# Patient Record
Sex: MALE | Race: WHITE | Employment: FULL TIME | URBAN - METROPOLITAN AREA
[De-identification: names, ages, dates, MRNs, and addresses within clinical notes are randomized per-mention and may not be internally consistent; named-entity substitution may affect disease eponyms.]

---

## 2023-11-29 ENCOUNTER — HOSPITAL ENCOUNTER (EMERGENCY)
Age: 48
Discharge: ANOTHER HOSPITAL | End: 2023-11-30
Attending: EMERGENCY MEDICINE
Payer: COMMERCIAL

## 2023-11-29 DIAGNOSIS — C78.00 MELANOMA METASTATIC TO LUNG, UNSPECIFIED LATERALITY (HCC): ICD-10-CM

## 2023-11-29 DIAGNOSIS — R04.2 MASSIVE HEMOPTYSIS: Primary | ICD-10-CM

## 2023-11-29 LAB
ANION GAP SERPL CALCULATED.3IONS-SCNC: 12 MMOL/L (ref 9–17)
BASOPHILS # BLD: 0 K/UL (ref 0–0.2)
BASOPHILS NFR BLD: 1 % (ref 0–2)
BUN SERPL-MCNC: 13 MG/DL (ref 6–20)
CALCIUM SERPL-MCNC: 9.5 MG/DL (ref 8.6–10.4)
CHLORIDE SERPL-SCNC: 103 MMOL/L (ref 98–107)
CO2 SERPL-SCNC: 22 MMOL/L (ref 20–31)
CREAT SERPL-MCNC: 0.9 MG/DL (ref 0.7–1.2)
EOSINOPHIL # BLD: 0.1 K/UL (ref 0–0.4)
EOSINOPHILS RELATIVE PERCENT: 2 % (ref 1–4)
ERYTHROCYTE [DISTWIDTH] IN BLOOD BY AUTOMATED COUNT: 13.4 % (ref 12.5–15.4)
GFR SERPL CREATININE-BSD FRML MDRD: >60 ML/MIN/1.73M2
GLUCOSE SERPL-MCNC: 88 MG/DL (ref 70–99)
HCT VFR BLD AUTO: 46.4 % (ref 41–53)
HGB BLD-MCNC: 16 G/DL (ref 13.5–17.5)
INR PPP: 1
LYMPHOCYTES NFR BLD: 1.9 K/UL (ref 1–4.8)
LYMPHOCYTES RELATIVE PERCENT: 30 % (ref 24–44)
MCH RBC QN AUTO: 31 PG (ref 26–34)
MCHC RBC AUTO-ENTMCNC: 34.4 G/DL (ref 31–37)
MCV RBC AUTO: 90.3 FL (ref 80–100)
MONOCYTES NFR BLD: 0.5 K/UL (ref 0.1–1.2)
MONOCYTES NFR BLD: 7 % (ref 2–11)
NEUTROPHILS NFR BLD: 60 % (ref 36–66)
NEUTS SEG NFR BLD: 4 K/UL (ref 1.8–7.7)
PARTIAL THROMBOPLASTIN TIME: 26 SEC (ref 21.3–31.3)
PLATELET # BLD AUTO: 232 K/UL (ref 140–450)
PMV BLD AUTO: 8.9 FL (ref 6–12)
POTASSIUM SERPL-SCNC: 4.6 MMOL/L (ref 3.7–5.3)
PROTHROMBIN TIME: 10.7 SEC (ref 9.4–12.6)
RBC # BLD AUTO: 5.14 M/UL (ref 4.5–5.9)
SODIUM SERPL-SCNC: 137 MMOL/L (ref 135–144)
WBC OTHER # BLD: 6.5 K/UL (ref 3.5–11)

## 2023-11-29 PROCEDURE — 85025 COMPLETE CBC W/AUTO DIFF WBC: CPT

## 2023-11-29 PROCEDURE — 85730 THROMBOPLASTIN TIME PARTIAL: CPT

## 2023-11-29 PROCEDURE — 93005 ELECTROCARDIOGRAM TRACING: CPT | Performed by: EMERGENCY MEDICINE

## 2023-11-29 PROCEDURE — 36415 COLL VENOUS BLD VENIPUNCTURE: CPT

## 2023-11-29 PROCEDURE — 85610 PROTHROMBIN TIME: CPT

## 2023-11-29 PROCEDURE — 99285 EMERGENCY DEPT VISIT HI MDM: CPT

## 2023-11-29 PROCEDURE — 80048 BASIC METABOLIC PNL TOTAL CA: CPT

## 2023-11-29 RX ORDER — ONDANSETRON 2 MG/ML
4 INJECTION INTRAMUSCULAR; INTRAVENOUS ONCE
Status: DISCONTINUED | OUTPATIENT
Start: 2023-11-29 | End: 2023-11-30 | Stop reason: HOSPADM

## 2023-11-29 ASSESSMENT — PAIN SCALES - GENERAL: PAINLEVEL_OUTOF10: 1

## 2023-11-29 ASSESSMENT — PAIN DESCRIPTION - PAIN TYPE: TYPE: ACUTE PAIN

## 2023-11-29 ASSESSMENT — PAIN - FUNCTIONAL ASSESSMENT
PAIN_FUNCTIONAL_ASSESSMENT: NONE - DENIES PAIN
PAIN_FUNCTIONAL_ASSESSMENT: 0-10

## 2023-11-29 ASSESSMENT — PAIN DESCRIPTION - DESCRIPTORS: DESCRIPTORS: TIGHTNESS

## 2023-11-29 ASSESSMENT — PAIN DESCRIPTION - LOCATION: LOCATION: CHEST

## 2023-11-29 NOTE — ED PROVIDER NOTES
[address] [facility] ENCOUNTER      Pt Name: Boogie Hung III  MRN: [id number]  [address] 1975  Date of evaluation: 11/29/2023  Provider: LAURA Ni CNP    CHIEF COMPLAINT       Chief Complaint   Patient presents with    Hemoptysis         HISTORY OF PRESENT ILLNESS   (Location/Symptom, Timing/Onset, Context/Setting, Quality, Duration, Modifying Factors, Severity)  Note limiting factors. Boogie Hung III is a 51 y.o. male who presents to the emergency department this is a 59-year-old nontoxic-appearing male who presents to the emergency department today for evaluation of hemoptysis. Patient states he was seen at [facility] last night into the early morning for same issue. He states he had a CT scan and lab work performed. He states he was told his lab work was normal and he was discharged home to follow-up with his oncology doctor up in [place]. Patient states he has a history of metastatic melanoma diagnosed approx 10 years ago. He states the melanoma did metastasize into his lungs. He states he did receive radiation treatment for this and he was told he was in remission for several years. He states he has not had to follow-up with his cancer doctor in over 3 years. He states last night he began having a cough and noted blood  in the production. He states due to his medical history he decided to come into the emergency department. He states while in the emergency [facility] he did not have any additional episodes and he did fine. He was discharged home, without reports around 1 AM he had another episode of bright red bloody hemoptysis. He then states around 9 AM this morning that he had a massive amount of hemoptysis that he reports was approximately 1/4-1/2 a cup of bloody hemoptysis. He stated that it did cause him some respiratory distress.   He did try to call his oncologist in [place], but he would not

## 2023-11-30 ENCOUNTER — ANESTHESIA EVENT (OUTPATIENT)
Dept: OPERATING ROOM | Age: 48
End: 2023-11-30
Payer: COMMERCIAL

## 2023-11-30 ENCOUNTER — ANESTHESIA (OUTPATIENT)
Dept: OPERATING ROOM | Age: 48
End: 2023-11-30
Payer: COMMERCIAL

## 2023-11-30 ENCOUNTER — HOSPITAL ENCOUNTER (OUTPATIENT)
Age: 48
Setting detail: OBSERVATION
Discharge: HOME | End: 2023-12-01
Attending: STUDENT IN AN ORGANIZED HEALTH CARE EDUCATION/TRAINING PROGRAM | Admitting: FAMILY MEDICINE
Payer: COMMERCIAL

## 2023-11-30 ENCOUNTER — HOSPITAL ENCOUNTER (INPATIENT)
Age: 48
LOS: 1 days | Discharge: STILL ADMITTED OR ADMINISTRATIVE | DRG: 181 | End: 2023-11-30
Attending: INTERNAL MEDICINE | Admitting: STUDENT IN AN ORGANIZED HEALTH CARE EDUCATION/TRAINING PROGRAM
Payer: COMMERCIAL

## 2023-11-30 VITALS
TEMPERATURE: 98.6 F | DIASTOLIC BLOOD PRESSURE: 95 MMHG | SYSTOLIC BLOOD PRESSURE: 134 MMHG | HEIGHT: 69 IN | OXYGEN SATURATION: 96 % | WEIGHT: 260 LBS | HEART RATE: 76 BPM | BODY MASS INDEX: 38.51 KG/M2 | RESPIRATION RATE: 16 BRPM

## 2023-11-30 VITALS
HEIGHT: 69 IN | TEMPERATURE: 96.8 F | SYSTOLIC BLOOD PRESSURE: 113 MMHG | DIASTOLIC BLOOD PRESSURE: 84 MMHG | RESPIRATION RATE: 15 BRPM | BODY MASS INDEX: 38.51 KG/M2 | WEIGHT: 260 LBS | HEART RATE: 78 BPM | OXYGEN SATURATION: 95 %

## 2023-11-30 DIAGNOSIS — C34.90 PRIMARY MALIGNANT NEOPLASM OF LUNG METASTATIC TO OTHER SITE, UNSPECIFIED LATERALITY (HCC): ICD-10-CM

## 2023-11-30 PROBLEM — G47.33 OSA (OBSTRUCTIVE SLEEP APNEA): Status: ACTIVE | Noted: 2023-11-30

## 2023-11-30 PROBLEM — R04.2 HEMOPTYSIS: Status: ACTIVE | Noted: 2023-11-30

## 2023-11-30 LAB
EKG ATRIAL RATE: 79 BPM
EKG P AXIS: 77 DEGREES
EKG P-R INTERVAL: 178 MS
EKG Q-T INTERVAL: 366 MS
EKG QRS DURATION: 86 MS
EKG QTC CALCULATION (BAZETT): 419 MS
EKG R AXIS: 25 DEGREES
EKG T AXIS: 21 DEGREES
EKG VENTRICULAR RATE: 79 BPM
POC INR: 1
PROTHROMBIN TIME, POC: 11.5 SEC (ref 10.4–14.2)

## 2023-11-30 PROCEDURE — G0378 HOSPITAL OBSERVATION PER HR: HCPCS

## 2023-11-30 PROCEDURE — 99222 1ST HOSP IP/OBS MODERATE 55: CPT | Performed by: STUDENT IN AN ORGANIZED HEALTH CARE EDUCATION/TRAINING PROGRAM

## 2023-11-30 PROCEDURE — 94761 N-INVAS EAR/PLS OXIMETRY MLT: CPT

## 2023-11-30 PROCEDURE — 88112 CYTOPATH CELL ENHANCE TECH: CPT

## 2023-11-30 PROCEDURE — 6370000000 HC RX 637 (ALT 250 FOR IP)

## 2023-11-30 PROCEDURE — 7100000001 HC PACU RECOVERY - ADDTL 15 MIN: Performed by: INTERNAL MEDICINE

## 2023-11-30 PROCEDURE — 31622 DX BRONCHOSCOPE/WASH: CPT | Performed by: INTERNAL MEDICINE

## 2023-11-30 PROCEDURE — 88305 TISSUE EXAM BY PATHOLOGIST: CPT

## 2023-11-30 PROCEDURE — 3700000000 HC ANESTHESIA ATTENDED CARE: Performed by: INTERNAL MEDICINE

## 2023-11-30 PROCEDURE — 3700000001 HC ADD 15 MINUTES (ANESTHESIA): Performed by: INTERNAL MEDICINE

## 2023-11-30 PROCEDURE — 99223 1ST HOSP IP/OBS HIGH 75: CPT | Performed by: INTERNAL MEDICINE

## 2023-11-30 PROCEDURE — 0B9B8ZZ DRAINAGE OF LEFT LOWER LOBE BRONCHUS, VIA NATURAL OR ARTIFICIAL OPENING ENDOSCOPIC: ICD-10-PCS | Performed by: INTERNAL MEDICINE

## 2023-11-30 PROCEDURE — 7100000000 HC PACU RECOVERY - FIRST 15 MIN: Performed by: INTERNAL MEDICINE

## 2023-11-30 PROCEDURE — 6370000000 HC RX 637 (ALT 250 FOR IP): Performed by: STUDENT IN AN ORGANIZED HEALTH CARE EDUCATION/TRAINING PROGRAM

## 2023-11-30 PROCEDURE — 87102 FUNGUS ISOLATION CULTURE: CPT

## 2023-11-30 PROCEDURE — 2700000000 HC OXYGEN THERAPY PER DAY

## 2023-11-30 PROCEDURE — 99254 IP/OBS CNSLTJ NEW/EST MOD 60: CPT | Performed by: INTERNAL MEDICINE

## 2023-11-30 PROCEDURE — 2580000003 HC RX 258: Performed by: NURSE ANESTHETIST, CERTIFIED REGISTERED

## 2023-11-30 PROCEDURE — 87015 SPECIMEN INFECT AGNT CONCNTJ: CPT

## 2023-11-30 PROCEDURE — 87116 MYCOBACTERIA CULTURE: CPT

## 2023-11-30 PROCEDURE — 87070 CULTURE OTHR SPECIMN AEROBIC: CPT

## 2023-11-30 PROCEDURE — 2709999900 HC NON-CHARGEABLE SUPPLY: Performed by: INTERNAL MEDICINE

## 2023-11-30 PROCEDURE — G0379 DIRECT REFER HOSPITAL OBSERV: HCPCS

## 2023-11-30 PROCEDURE — 6360000002 HC RX W HCPCS: Performed by: NURSE ANESTHETIST, CERTIFIED REGISTERED

## 2023-11-30 PROCEDURE — 3609010800 HC BRONCHOSCOPY ALVEOLAR LAVAGE: Performed by: INTERNAL MEDICINE

## 2023-11-30 PROCEDURE — 0B988ZZ DRAINAGE OF LEFT UPPER LOBE BRONCHUS, VIA NATURAL OR ARTIFICIAL OPENING ENDOSCOPIC: ICD-10-PCS | Performed by: INTERNAL MEDICINE

## 2023-11-30 PROCEDURE — 87205 SMEAR GRAM STAIN: CPT

## 2023-11-30 PROCEDURE — 0B978ZZ DRAINAGE OF LEFT MAIN BRONCHUS, VIA NATURAL OR ARTIFICIAL OPENING ENDOSCOPIC: ICD-10-PCS | Performed by: INTERNAL MEDICINE

## 2023-11-30 PROCEDURE — 85610 PROTHROMBIN TIME: CPT

## 2023-11-30 PROCEDURE — 2500000003 HC RX 250 WO HCPCS

## 2023-11-30 PROCEDURE — 0B998ZZ DRAINAGE OF LINGULA BRONCHUS, VIA NATURAL OR ARTIFICIAL OPENING ENDOSCOPIC: ICD-10-PCS | Performed by: INTERNAL MEDICINE

## 2023-11-30 PROCEDURE — 2500000003 HC RX 250 WO HCPCS: Performed by: NURSE ANESTHETIST, CERTIFIED REGISTERED

## 2023-11-30 PROCEDURE — 1200000000 HC SEMI PRIVATE

## 2023-11-30 PROCEDURE — 2580000003 HC RX 258: Performed by: NURSE PRACTITIONER

## 2023-11-30 PROCEDURE — 6370000000 HC RX 637 (ALT 250 FOR IP): Performed by: INTERNAL MEDICINE

## 2023-11-30 PROCEDURE — 87206 SMEAR FLUORESCENT/ACID STAI: CPT

## 2023-11-30 RX ORDER — SODIUM CHLORIDE 9 MG/ML
INJECTION, SOLUTION INTRAVENOUS PRN
Status: DISCONTINUED | OUTPATIENT
Start: 2023-11-30 | End: 2023-12-01 | Stop reason: HOSPADM

## 2023-11-30 RX ORDER — NICOTINE 21 MG/24HR
1 PATCH, TRANSDERMAL 24 HOURS TRANSDERMAL DAILY
Status: DISCONTINUED | OUTPATIENT
Start: 2023-11-30 | End: 2023-12-01 | Stop reason: HOSPADM

## 2023-11-30 RX ORDER — CODEINE SULFATE 15 MG/1
30 TABLET ORAL EVERY 6 HOURS SCHEDULED
Status: DISCONTINUED | OUTPATIENT
Start: 2023-11-30 | End: 2023-11-30 | Stop reason: HOSPADM

## 2023-11-30 RX ORDER — FENTANYL CITRATE 50 UG/ML
25 INJECTION, SOLUTION INTRAMUSCULAR; INTRAVENOUS EVERY 5 MIN PRN
Status: DISCONTINUED | OUTPATIENT
Start: 2023-11-30 | End: 2023-11-30 | Stop reason: HOSPADM

## 2023-11-30 RX ORDER — SODIUM CHLORIDE 0.9 % (FLUSH) 0.9 %
10 SYRINGE (ML) INJECTION PRN
Status: DISCONTINUED | OUTPATIENT
Start: 2023-11-30 | End: 2023-12-01 | Stop reason: HOSPADM

## 2023-11-30 RX ORDER — ONDANSETRON 2 MG/ML
INJECTION INTRAMUSCULAR; INTRAVENOUS PRN
Status: DISCONTINUED | OUTPATIENT
Start: 2023-11-30 | End: 2023-11-30 | Stop reason: SDUPTHER

## 2023-11-30 RX ORDER — HYDROCODONE BITARTRATE AND ACETAMINOPHEN 5; 325 MG/1; MG/1
1 TABLET ORAL EVERY 6 HOURS
Status: DISCONTINUED | OUTPATIENT
Start: 2023-11-30 | End: 2023-11-30

## 2023-11-30 RX ORDER — MAGNESIUM SULFATE 1 G/100ML
1000 INJECTION INTRAVENOUS PRN
Status: DISCONTINUED | OUTPATIENT
Start: 2023-11-30 | End: 2023-12-01 | Stop reason: HOSPADM

## 2023-11-30 RX ORDER — PREDNISONE 20 MG/1
40 TABLET ORAL DAILY
Status: DISCONTINUED | OUTPATIENT
Start: 2023-11-30 | End: 2023-11-30 | Stop reason: HOSPADM

## 2023-11-30 RX ORDER — DEXAMETHASONE SODIUM PHOSPHATE 10 MG/ML
INJECTION INTRAMUSCULAR; INTRAVENOUS PRN
Status: DISCONTINUED | OUTPATIENT
Start: 2023-11-30 | End: 2023-11-30 | Stop reason: SDUPTHER

## 2023-11-30 RX ORDER — ROCURONIUM BROMIDE 10 MG/ML
INJECTION, SOLUTION INTRAVENOUS PRN
Status: DISCONTINUED | OUTPATIENT
Start: 2023-11-30 | End: 2023-11-30 | Stop reason: SDUPTHER

## 2023-11-30 RX ORDER — ONDANSETRON 4 MG/1
4 TABLET, ORALLY DISINTEGRATING ORAL EVERY 8 HOURS PRN
Status: DISCONTINUED | OUTPATIENT
Start: 2023-11-30 | End: 2023-12-01 | Stop reason: HOSPADM

## 2023-11-30 RX ORDER — PROPOFOL 10 MG/ML
INJECTION, EMULSION INTRAVENOUS PRN
Status: DISCONTINUED | OUTPATIENT
Start: 2023-11-30 | End: 2023-11-30 | Stop reason: SDUPTHER

## 2023-11-30 RX ORDER — POTASSIUM CHLORIDE 20 MEQ/1
40 TABLET, EXTENDED RELEASE ORAL PRN
Status: DISCONTINUED | OUTPATIENT
Start: 2023-11-30 | End: 2023-12-01 | Stop reason: HOSPADM

## 2023-11-30 RX ORDER — LEVOFLOXACIN 500 MG/1
500 TABLET, FILM COATED ORAL DAILY
Status: DISCONTINUED | OUTPATIENT
Start: 2023-11-30 | End: 2023-11-30 | Stop reason: HOSPADM

## 2023-11-30 RX ORDER — POTASSIUM CHLORIDE 7.45 MG/ML
10 INJECTION INTRAVENOUS PRN
Status: DISCONTINUED | OUTPATIENT
Start: 2023-11-30 | End: 2023-12-01 | Stop reason: HOSPADM

## 2023-11-30 RX ORDER — FENTANYL CITRATE 50 UG/ML
50 INJECTION, SOLUTION INTRAMUSCULAR; INTRAVENOUS EVERY 5 MIN PRN
Status: DISCONTINUED | OUTPATIENT
Start: 2023-11-30 | End: 2023-11-30 | Stop reason: HOSPADM

## 2023-11-30 RX ORDER — ACETAMINOPHEN AND CODEINE PHOSPHATE 300; 30 MG/1; MG/1
1 TABLET ORAL EVERY 6 HOURS
Status: DISCONTINUED | OUTPATIENT
Start: 2023-11-30 | End: 2023-11-30

## 2023-11-30 RX ORDER — NICOTINE 21 MG/24HR
1 PATCH, TRANSDERMAL 24 HOURS TRANSDERMAL DAILY
Status: DISCONTINUED | OUTPATIENT
Start: 2023-11-30 | End: 2023-11-30 | Stop reason: HOSPADM

## 2023-11-30 RX ORDER — SODIUM CHLORIDE 9 MG/ML
INJECTION, SOLUTION INTRAVENOUS PRN
Status: DISCONTINUED | OUTPATIENT
Start: 2023-11-30 | End: 2023-11-30 | Stop reason: HOSPADM

## 2023-11-30 RX ORDER — POLYETHYLENE GLYCOL 3350 17 G/17G
17 POWDER, FOR SOLUTION ORAL DAILY PRN
Status: DISCONTINUED | OUTPATIENT
Start: 2023-11-30 | End: 2023-12-01 | Stop reason: HOSPADM

## 2023-11-30 RX ORDER — SODIUM CHLORIDE 0.9 % (FLUSH) 0.9 %
5-40 SYRINGE (ML) INJECTION EVERY 12 HOURS SCHEDULED
Status: DISCONTINUED | OUTPATIENT
Start: 2023-11-30 | End: 2023-12-01 | Stop reason: HOSPADM

## 2023-11-30 RX ORDER — PREDNISONE 20 MG/1
40 TABLET ORAL DAILY
Status: DISCONTINUED | OUTPATIENT
Start: 2023-11-30 | End: 2023-12-01 | Stop reason: HOSPADM

## 2023-11-30 RX ORDER — SODIUM CHLORIDE, SODIUM LACTATE, POTASSIUM CHLORIDE, CALCIUM CHLORIDE 600; 310; 30; 20 MG/100ML; MG/100ML; MG/100ML; MG/100ML
INJECTION, SOLUTION INTRAVENOUS CONTINUOUS PRN
Status: DISCONTINUED | OUTPATIENT
Start: 2023-11-30 | End: 2023-11-30 | Stop reason: SDUPTHER

## 2023-11-30 RX ORDER — FENTANYL CITRATE 50 UG/ML
INJECTION, SOLUTION INTRAMUSCULAR; INTRAVENOUS PRN
Status: DISCONTINUED | OUTPATIENT
Start: 2023-11-30 | End: 2023-11-30 | Stop reason: SDUPTHER

## 2023-11-30 RX ORDER — LIDOCAINE HYDROCHLORIDE 10 MG/ML
INJECTION, SOLUTION EPIDURAL; INFILTRATION; INTRACAUDAL; PERINEURAL PRN
Status: DISCONTINUED | OUTPATIENT
Start: 2023-11-30 | End: 2023-11-30 | Stop reason: SDUPTHER

## 2023-11-30 RX ORDER — CODEINE PHOSPHATE AND GUAIFENESIN 10; 100 MG/5ML; MG/5ML
5 SOLUTION ORAL EVERY 6 HOURS
Status: COMPLETED | OUTPATIENT
Start: 2023-11-30 | End: 2023-12-01

## 2023-11-30 RX ORDER — ACETAMINOPHEN 325 MG/1
650 TABLET ORAL EVERY 6 HOURS PRN
Status: DISCONTINUED | OUTPATIENT
Start: 2023-11-30 | End: 2023-12-01 | Stop reason: HOSPADM

## 2023-11-30 RX ORDER — SODIUM CHLORIDE 0.9 % (FLUSH) 0.9 %
5-40 SYRINGE (ML) INJECTION EVERY 12 HOURS SCHEDULED
Status: DISCONTINUED | OUTPATIENT
Start: 2023-11-30 | End: 2023-11-30 | Stop reason: HOSPADM

## 2023-11-30 RX ORDER — ACETAMINOPHEN 650 MG/1
650 SUPPOSITORY RECTAL EVERY 6 HOURS PRN
Status: DISCONTINUED | OUTPATIENT
Start: 2023-11-30 | End: 2023-12-01 | Stop reason: HOSPADM

## 2023-11-30 RX ORDER — LEVOFLOXACIN 500 MG/1
500 TABLET, FILM COATED ORAL DAILY
Status: DISCONTINUED | OUTPATIENT
Start: 2023-11-30 | End: 2023-12-01 | Stop reason: HOSPADM

## 2023-11-30 RX ORDER — ALBUTEROL SULFATE 90 UG/1
AEROSOL, METERED RESPIRATORY (INHALATION) PRN
Status: DISCONTINUED | OUTPATIENT
Start: 2023-11-30 | End: 2023-11-30 | Stop reason: SDUPTHER

## 2023-11-30 RX ORDER — SODIUM CHLORIDE 0.9 % (FLUSH) 0.9 %
5-40 SYRINGE (ML) INJECTION PRN
Status: DISCONTINUED | OUTPATIENT
Start: 2023-11-30 | End: 2023-11-30 | Stop reason: HOSPADM

## 2023-11-30 RX ORDER — ONDANSETRON 2 MG/ML
4 INJECTION INTRAMUSCULAR; INTRAVENOUS EVERY 6 HOURS PRN
Status: DISCONTINUED | OUTPATIENT
Start: 2023-11-30 | End: 2023-12-01 | Stop reason: HOSPADM

## 2023-11-30 RX ADMIN — ONDANSETRON 4 MG: 2 INJECTION INTRAMUSCULAR; INTRAVENOUS at 13:17

## 2023-11-30 RX ADMIN — ALBUTEROL SULFATE 2 PUFF: 90 AEROSOL, METERED RESPIRATORY (INHALATION) at 13:40

## 2023-11-30 RX ADMIN — PREDNISONE 40 MG: 20 TABLET ORAL at 16:54

## 2023-11-30 RX ADMIN — GUAIFENESIN AND CODEINE PHOSPHATE 5 ML: 100; 10 SOLUTION ORAL at 18:33

## 2023-11-30 RX ADMIN — ROCURONIUM BROMIDE 50 MG: 10 INJECTION, SOLUTION INTRAVENOUS at 13:06

## 2023-11-30 RX ADMIN — SUGAMMADEX 200 MG: 100 INJECTION, SOLUTION INTRAVENOUS at 13:38

## 2023-11-30 RX ADMIN — SODIUM CHLORIDE, POTASSIUM CHLORIDE, SODIUM LACTATE AND CALCIUM CHLORIDE: 600; 310; 30; 20 INJECTION, SOLUTION INTRAVENOUS at 13:02

## 2023-11-30 RX ADMIN — LIDOCAINE HYDROCHLORIDE 50 MG: 10 INJECTION, SOLUTION EPIDURAL; INFILTRATION; INTRACAUDAL; PERINEURAL at 13:06

## 2023-11-30 RX ADMIN — DEXAMETHASONE SODIUM PHOSPHATE 4 MG: 10 INJECTION INTRAMUSCULAR; INTRAVENOUS at 13:15

## 2023-11-30 RX ADMIN — LEVOFLOXACIN 500 MG: 500 TABLET, FILM COATED ORAL at 16:54

## 2023-11-30 RX ADMIN — PROPOFOL 200 MG: 10 INJECTION, EMULSION INTRAVENOUS at 13:06

## 2023-11-30 RX ADMIN — FENTANYL CITRATE 100 MCG: 50 INJECTION, SOLUTION INTRAMUSCULAR; INTRAVENOUS at 13:06

## 2023-11-30 RX ADMIN — SODIUM CHLORIDE, PRESERVATIVE FREE 10 ML: 5 INJECTION INTRAVENOUS at 20:20

## 2023-11-30 ASSESSMENT — PAIN SCALES - GENERAL: PAINLEVEL_OUTOF10: 0

## 2023-11-30 NOTE — OP NOTE
Operative Note      Patient: Beny Zavala III  YOB: 1975  MRN: [id number]    Date of Procedure: 11/30/2023    Pre-Op Diagnosis Codes:     * Primary malignant neoplasm of lung metastatic to other site, unspecified laterality ([address]) [C34.90]    Post-Op Diagnosis:   bleeding site left upper lobe apical posterior LB1 segment . No active bleeding . No endobronchial mass . No purulent secretions   Mucosa shows pitting - chronic bronchitis        Procedure(s):  DIAGNOSTIC BRONCHOSCOPY                          Surgeon(s):  Marielle Hassan MD    Assistant:   * No surgical staff found *    Anesthesia: General    Estimated Blood Loss (mL): 0 ml     Complications: None    Specimens:   ID Type Source Tests Collected by Time Destination   A : LEFT BRONCHIAL [address] Body Fluid Lung CYTOLOGY, NON-GYN, CULTURE, FUNGUS, CULTURE WITH SMEAR, ACID FAST BACILLIUS, CULTURE, RESPIRATORY Manoj Quintana MD 11/30/2023 1320        Implants:  * No implants in log *      Drains: * No LDAs found *    (Please see today's progress notes for the latest issues,  physical exam and lab data)    Consent to Procedure  The risks, benefits, complications, treatment options and expected outcomes were discussed with the patient and/or POA  The possibilities of reaction to medication, pulmonary aspiration, perforation of a viscus, bleeding, failure to diagnose a condition and creating a complication requiring transfusion or operation were discussed with the patient who freely signed the consent. Description of Procedure  The patient was intubated on mechanical ventilation and placed on 100% oxygen. [facility] was monitored by the Critical Nursing and Respiratory therapy staff and the standard ICU monitoring devices. Oracio Marte [facility] and the procedure were verified as Flexible Fiberoptic Bronchoscopy. A Time Out was held and the above information confirmed. The patient was placed in the appropriate position.  The patient was sedated using GA    The bronchoscope was then passed into the trachea via the ET tube. After careful inspection of the trachea, the bronchoscope was sequentially passed into all segments of the left and right endobronchial trees to the second and/or third divisions with IT scope and once blood was suctioned , 180 Bronchoscope used for detailed endobronchial exam .    Endobronchial findings    Trachea: distal  tracheal  no stenosis. No endotracheal mass   Normal mucosa, Sharp, and no bleeding   Krystyna  Normal mucosa, Sharp, and blood clot noted , suctioned , no bleeding , no endobronchial mass . Right Main Stem Bronchus  pitting of mucosa consistent with bronchitis , Sharp, no active bleeding , no endobronchial mass . Right Upper Lobe Bronchi pitting of mucosa consistent with bronchitis , Sharp, no active bleeding , no endobronchial mass . Right Middle Lobe Bronchi  pitting of mucosa consistent with bronchitis , Sharp, no active bleeding , no endobronchial mass . Right Lower Lobe Bronchi (including the Superior segment)  pitting of mucosa consistent with bronchitis , Sharp, no bleeding , no endobronchial mass . Left Main Stem Bronchus pitting of mucosa consistent with bronchitis , Sharp, and blood noted , suctioned , no active  bleeding , no endobronchial mass . Left Upper Lobe Bronchus, Upper Division pitting of mucosa consistent with bronchitis , Sharp, and blood noted , suctioned , in the apico posterior  LB1 segment ,blood clot noted which was washed with saline multiple times , no active bleeding , no endobronchial mass   Left Upper Lobe Bronchus, Lingula  pitting of mucosa consistent with bronchitis , Sharp, and blood noted , suctioned , no active  bleeding , no endobronchial mass . Left Lower Lobe Bronchus (including the Superior segment)  pitting of mucosa consistent with bronchitis , Sharp, and blood noted , suctioned , no active  bleeding , no endobronchial mass .     Recommendation No active bleeding   No anti platelets or anticoagulants   Prednisone 40 mg po daily for 5 days   Levoquin 500 mg po daily for 5 days   Codeine 30 mg tablet po every 6 hrs for 24 hrs to suppress cough   Bleeding source from left upper lobe fibrotic area which is likely due to his radiation therapy   If hemoptysis worsens then will need IR evaluation for bronchial artery embolization .     D/w Dr Tessa Rees   Wife updated   Electronically signed by [place] MD on 11/30/2023 at 2:00 PM

## 2023-11-30 NOTE — ED NOTES
Dr Almas Szymanski, was at bedside and seen patient, he would like the patient transferred to [facility] pre-op. He will be waiting there for him.      Yahir Nicholas RN  11/30/23 1038

## 2023-11-30 NOTE — CONSULTS
No  Lower ext edema No1+   [] , 2 +  [] , 3+   []  Upper ext edema No       Musculoskeletal - no joint swelling or tenderness or synovitis          CBC:   Recent Labs     11/29/23  1330   WBC 6.5   HGB 16.0        BMP:    Recent Labs     11/29/23  1505      K 4.6      CO2 22   BUN 13   CREATININE 0.9   GLUCOSE 88     Hepatic: No results for input(s): \"AST\", \"ALT\", \"ALB\", \"BILITOT\", \"ALKPHOS\" in the last 72 hours. Amylase: No results found for: \"AMYLASE\"  Lipase: No results found for: \"LIPASE\"  Troponin: No results for input(s): \"TROPONINI\" in the last 72 hours. BNP: No results for input(s): \"BNP\" in the last 72 hours. Lipids: No results for input(s): \"CHOL\", \"HDL\" in the last 72 hours. Invalid input(s): \"LDLCALCU\"  ABGs: No results found for: \"PHART\", \"PO2ART\", \"CGY5WOD\"  INR:   Recent Labs     11/29/23  1330   INR 1.0     Thyroid: No results found for: \"T4\", \"TSH\"  Urinalysis: No results for input(s): \"BACTERIA\", \"BLOODU\", \"CLARITYU\", \"COLORU\", \"PHUR\", \"PROTEINU\", \"RBCUA\", \"SPECGRAV\", \"BILIRUBINUR\", \"NITRU\", \"WBCUA\", \"LEUKOCYTESUR\", \"GLUCOSEU\" in the last 72 hours. DIAGNOSIS: Melanoma, stage IV (BRAF V600E positive). DELVIN following IL2/RT    CURRENT MANAGEMENT: Active surveillance    IMAGING PLAN: CT neck/chest and brain MRI in January 2019    TREATMENT HISTORY:    RADIATION THERAPY: Radiation to left hilar mass. Dates: 1/30/13 - 2/14/13. 3000 cGy in 10 fractions. FIRST LINE THERAPY: High dose IL-2 x two courses from 11/12/12 to 11/16/12 and 11/26/12 to 11/30/12. IMPRESSION:    Patient Active Problem List   Diagnosis Code    Malignant neoplasm metastatic to lung [facility]) C78.00    Malignant melanoma of scalp ([address]) C43.4    Deep vein thrombosis of right upper extremity (HCC) I82.621    Chest pain R07.9    H/O cardiovascular stress test (11/7/14 [facility]) Z92.89     Hemoptysis ?  Endobronchial tumor - bronchogenic or melanoma / from left upper lobe scaring   Metastatic melanoma to lung hx - post immunotherapy in 2012  and radiation left of left hilar mass    MORAIMA on CPAP   PLAN:      Transfer to [facility] for bronchoscopy with Bx if needed . Based on bronchoscopy findings oncology and IR consult   Nicotine patch   D/w Endoscopy pt will be transferred directly to pre op area at [facility] and further disposition post bronchoscopy . D/w patient and wife - that he may need to remain intubated post procedure and will then need ICU admission   I reviewed the complications of the procedure which could be pneumothorax, bleeding , nondiagnostic biopsy and cardiorespiratory arrest.  Patient is agreeable to proceed. Pt updated   D/w Dr Mckayla Rolon     I hope this updates you on my evaluation and clinical thinking. Thank you for allowing me to participate in his care.      Sincerely,      Electronically signed by Nima Wayne MD on 11/30/2023 at 9:21 AM

## 2023-11-30 NOTE — H&P
[facility]  Office: 7900  1826, DO, Jackelyn Edler, DO, Raji Mauricio, DO, Taryn Eid, DO, Bobbi Nix MD, Dariana Loomis MD, Chip Bautista MD, Miranda Lama MD,  Jos Bailey MD, Salomon Page MD, Catrachita Al MD,  Ivette Vigil MD, Rama Whitlock MD, Sahara Paredes, DO, Gustavus Najjar, MD,  Tima Alvarado, DO, Collin Del Rio MD, Gerald Kanner, MD, Eron Quintana MD, Marco Huitron MD,  Myrna Richardson MD, Guy Weiss MD, Regis Murphy MD, Glenny Shell MD, Aura Steele MD, Sukhi Graham MD, Cookie Brittle, DO, Alexandra Pantoja, DO, Tatum Bryant MD,  Vish Sierra MD, Maia Connolly, CNP,  Marcela Locke CNP, Toma Ugarte, CNP,  Ashish Squires, [facility], Virgilio Chahal, CNP, David Graf, CNP, Hansel Angelucci, CNP, Jacquie Medley, CNP, Dara Mena, CNP, Anoop Jesus PAMesfinC, Tata Landers PAMesfinC, Denae West, CNP, Steve Rodriguez, [facility], Samson Bonner, CNP, Pelon Collado, CNP, Beto Minor, [facility]         [facility]    HISTORY AND PHYSICAL EXAMINATION            Date:   11/30/2023  Patient name:  Dorrene Primrose III  Date of admission:  11/30/2023  3:15 PM  MRN:   [id number]  Account:  [de-identified]  YOB: 1975  PCP:    Ron Rodriguez  Room:   4286/5701-33  Code Status:    Full Code    Chief Complaint:     Hemoptysis. History Obtained From:     patient, electronic medical record    History of Present Illness: This is a 62-year-old male with a significant past medical history of malignant melanoma with metastatic lung cancer who initially presented to [facility] emergency department with a chief complaint of hemoptysis with concern for possible endobronchial tumor bronchogenic versus melanoma or blood from left upper lobe scarring.   He was evaluated by pulmonology with plan to transfer directly to preop area and [address] for PM   Result Value Ref Range    Prothrombin time,(POC) 11.5 10.4 - 14.2 sec    POC INR 1.0      Imaging/Diagnostics:  No results found. Assessment :      Hospital Problems             Last Modified POA    * (Principal) Hemoptysis 11/30/2023 Yes    Malignant neoplasm metastatic to lung ([address]) 11/30/2023 Yes    Malignant melanoma of scalp ([address]) 11/30/2023 Yes    MORAIMA (obstructive sleep apnea) 11/30/2023 Yes     Plan:     Patient status inpatient in the Med/Surge    Hemoptysis. Status post bronchoscopy 11/30/2023 with no endobronchial tumor believed to be secondary to left upper lobe scarring. No active bleeding. Pulmonology following. Plan to monitor on prednisone 40 mg x 5 days, Levaquin 500 mg daily x5 days, codeine 30 mg every 6 hours scheduled for cough suppression. Monitor for rebleeding overnight however may have some mild hemoptysis. Pulmonology suggesting IR consult for pulmonary artery embolization if significant bleeding occurs. History of malignant melanoma with mets to the lung. Status post immunotherapy 2012. Obstructive sleep apnea on CPAP. Home CPAP machine. DVT prophylaxis: EPC cuffs due to hemoptysis. GI prophylaxis: We will start prednisone 40 mg daily while on steroids. Discharge planning: Monitor for increased mopped assist overnight. Possible discharge tomorrow. Consultations:   IP CONSULT TO PULMONOLOGY  IP CONSULT TO RESPIRATORY CARE    Patient is admitted as inpatient status because of co-morbidities listed above, severity of signs and symptoms as outlined, requirement for current medical therapies and most importantly because of direct risk to patient if care not provided in a hospital setting. Expected length of stay > 48 hours.     [facility]   11/30/2023  3:32 PM    Copy sent to Dr. Jeison Sutherland

## 2023-11-30 NOTE — ANESTHESIA PRE PROCEDURE
134/95   12/10/15 148/80   12/10/15 148/80       NPO Status:                                                                                 BMI:   Wt Readings from Last 3 Encounters:   11/29/23 117.9 kg (260 lb)   12/10/15 118.4 kg (261 lb)   12/10/15 118.4 kg (261 lb)     There is no height or weight on file to calculate BMI.    CBC:   Lab Results   Component Value Date/Time    WBC 6.5 11/29/2023 01:30 PM    RBC 5.14 11/29/2023 01:30 PM    HGB 16.0 11/29/2023 01:30 PM    HCT 46.4 11/29/2023 01:30 PM    MCV 90.3 11/29/2023 01:30 PM    RDW 13.4 11/29/2023 01:30 PM     11/29/2023 01:30 PM       CMP:   Lab Results   Component Value Date/Time     11/29/2023 03:05 PM    K 4.6 11/29/2023 03:05 PM     11/29/2023 03:05 PM    CO2 22 11/29/2023 03:05 PM    BUN 13 11/29/2023 03:05 PM    CREATININE 0.9 11/29/2023 03:05 PM    LABGLOM >60 11/29/2023 03:05 PM    GLUCOSE 88 11/29/2023 03:05 PM    CALCIUM 9.5 11/29/2023 03:05 PM       POC Tests: No results for input(s): \"POCGLU\", \"POCNA\", \"POCK\", \"POCCL\", \"POCBUN\", \"POCHEMO\", \"POCHCT\" in the last 72 hours.     Coags:   Lab Results   Component Value Date/Time    PROTIME 10.7 11/29/2023 01:30 PM    INR 1.0 11/29/2023 01:30 PM    APTT 26.0 11/29/2023 01:30 PM       HCG (If Applicable): No results found for: \"PREGTESTUR\", \"PREGSERUM\", \"HCG\", \"HCGQUANT\"     ABGs: No results found for: \"PHART\", \"PO2ART\", \"XTV1ANJ\", \"SAS2STB\", \"BEART\", \"Z2WRQTKE\"     Type & Screen (If Applicable):  No results found for: \"LABABO\", \"LABRH\"    Drug/Infectious Status (If Applicable):  No results found for: \"HIV\", \"HEPCAB\"    COVID-19 Screening (If Applicable): No results found for: \"COVID19\"        Anesthesia Evaluation    Airway: Mallampati: III     Neck ROM: full     Dental: normal exam         Pulmonary: breath sounds clear to auscultation  (+) sleep apnea:                            ROS comment: Malignant neoplasm metastatic to lung [facility])   Cardiovascular:Negative CV

## 2023-12-01 VITALS
BODY MASS INDEX: 39.25 KG/M2 | OXYGEN SATURATION: 94 % | TEMPERATURE: 98 F | HEART RATE: 60 BPM | DIASTOLIC BLOOD PRESSURE: 84 MMHG | HEIGHT: 69 IN | SYSTOLIC BLOOD PRESSURE: 132 MMHG | RESPIRATION RATE: 16 BRPM | WEIGHT: 264.99 LBS

## 2023-12-01 PROBLEM — C34.90 PRIMARY MALIGNANT NEOPLASM OF LUNG METASTATIC TO OTHER SITE (HCC): Status: ACTIVE | Noted: 2023-12-01

## 2023-12-01 LAB
ANION GAP SERPL CALCULATED.3IONS-SCNC: 10 MMOL/L (ref 9–17)
BASOPHILS # BLD: 0.03 K/UL (ref 0–0.2)
BASOPHILS NFR BLD: 0 % (ref 0–2)
BUN SERPL-MCNC: 17 MG/DL (ref 6–20)
CALCIUM SERPL-MCNC: 8.8 MG/DL (ref 8.6–10.4)
CASE NUMBER:: NORMAL
CHLORIDE SERPL-SCNC: 102 MMOL/L (ref 98–107)
CO2 SERPL-SCNC: 24 MMOL/L (ref 20–31)
CREAT SERPL-MCNC: 1.1 MG/DL (ref 0.7–1.2)
EOSINOPHIL # BLD: 0.05 K/UL (ref 0–0.44)
EOSINOPHILS RELATIVE PERCENT: 0 % (ref 1–4)
ERYTHROCYTE [DISTWIDTH] IN BLOOD BY AUTOMATED COUNT: 12.3 % (ref 11.8–14.4)
GFR SERPL CREATININE-BSD FRML MDRD: >60 ML/MIN/1.73M2
GLUCOSE SERPL-MCNC: 183 MG/DL (ref 70–99)
HCT VFR BLD AUTO: 47.9 % (ref 40.7–50.3)
HGB BLD-MCNC: 15.5 G/DL (ref 13–17)
IMM GRANULOCYTES # BLD AUTO: 0.06 K/UL (ref 0–0.3)
IMM GRANULOCYTES NFR BLD: 1 %
INR PPP: 1
LYMPHOCYTES NFR BLD: 2.13 K/UL (ref 1.1–3.7)
LYMPHOCYTES RELATIVE PERCENT: 17 % (ref 24–43)
MCH RBC QN AUTO: 30.6 PG (ref 25.2–33.5)
MCHC RBC AUTO-ENTMCNC: 32.4 G/DL (ref 28.4–34.8)
MCV RBC AUTO: 94.5 FL (ref 82.6–102.9)
MICROORGANISM SPEC CULT: NORMAL
MICROORGANISM/AGENT SPEC: NORMAL
MONOCYTES NFR BLD: 0.82 K/UL (ref 0.1–1.2)
MONOCYTES NFR BLD: 6 % (ref 3–12)
NEUTROPHILS NFR BLD: 76 % (ref 36–65)
NEUTS SEG NFR BLD: 9.69 K/UL (ref 1.5–8.1)
NRBC BLD-RTO: 0 PER 100 WBC
PLATELET # BLD AUTO: 248 K/UL (ref 138–453)
PMV BLD AUTO: 10 FL (ref 8.1–13.5)
POTASSIUM SERPL-SCNC: 4.6 MMOL/L (ref 3.7–5.3)
PROTHROMBIN TIME: 12.6 SEC (ref 11.7–14.9)
RBC # BLD AUTO: 5.07 M/UL (ref 4.21–5.77)
SODIUM SERPL-SCNC: 136 MMOL/L (ref 135–144)
SPECIMEN DESCRIPTION: NORMAL
WBC OTHER # BLD: 12.8 K/UL (ref 3.5–11.3)

## 2023-12-01 PROCEDURE — 36415 COLL VENOUS BLD VENIPUNCTURE: CPT

## 2023-12-01 PROCEDURE — 99238 HOSP IP/OBS DSCHRG MGMT 30/<: CPT | Performed by: FAMILY MEDICINE

## 2023-12-01 PROCEDURE — 6370000000 HC RX 637 (ALT 250 FOR IP): Performed by: INTERNAL MEDICINE

## 2023-12-01 PROCEDURE — 85610 PROTHROMBIN TIME: CPT

## 2023-12-01 PROCEDURE — G0378 HOSPITAL OBSERVATION PER HR: HCPCS

## 2023-12-01 PROCEDURE — 80048 BASIC METABOLIC PNL TOTAL CA: CPT

## 2023-12-01 PROCEDURE — 85025 COMPLETE CBC W/AUTO DIFF WBC: CPT

## 2023-12-01 PROCEDURE — 6370000000 HC RX 637 (ALT 250 FOR IP): Performed by: STUDENT IN AN ORGANIZED HEALTH CARE EDUCATION/TRAINING PROGRAM

## 2023-12-01 RX ORDER — LEVOFLOXACIN 500 MG/1
500 TABLET, FILM COATED ORAL DAILY
Qty: 3 TABLET | Refills: 0 | Status: SHIPPED | OUTPATIENT
Start: 2023-12-02 | End: 2023-12-05

## 2023-12-01 RX ORDER — PREDNISONE 20 MG/1
40 TABLET ORAL DAILY
Qty: 6 TABLET | Refills: 0 | Status: SHIPPED | OUTPATIENT
Start: 2023-12-02 | End: 2023-12-05

## 2023-12-01 RX ADMIN — GUAIFENESIN AND CODEINE PHOSPHATE 5 ML: 100; 10 SOLUTION ORAL at 01:06

## 2023-12-01 RX ADMIN — PREDNISONE 40 MG: 20 TABLET ORAL at 09:32

## 2023-12-01 RX ADMIN — LEVOFLOXACIN 500 MG: 500 TABLET, FILM COATED ORAL at 09:32

## 2023-12-01 RX ADMIN — GUAIFENESIN AND CODEINE PHOSPHATE 5 ML: 100; 10 SOLUTION ORAL at 06:47

## 2023-12-01 ASSESSMENT — ENCOUNTER SYMPTOMS
BACK PAIN: 0
BACK PAIN: 0
NAUSEA: 0
CHEST TIGHTNESS: 0
DIARRHEA: 0
CONSTIPATION: 0
SHORTNESS OF BREATH: 1
VOMITING: 0
COUGH: 0
CONSTIPATION: 0
VOICE CHANGE: 0
ABDOMINAL PAIN: 0
NAUSEA: 0
DIARRHEA: 0
COUGH: 0
SHORTNESS OF BREATH: 0
VOMITING: 0
SINUS PRESSURE: 0
WHEEZING: 0
RHINORRHEA: 0
SHORTNESS OF BREATH: 1
CHOKING: 0

## 2023-12-01 ASSESSMENT — PAIN SCALES - GENERAL: PAINLEVEL_OUTOF10: 0

## 2023-12-01 NOTE — CARE COORDINATION
12/01/23 1044   Readmission Assessment   Number of Days since last admission? 1-7 days  ([place] transfer)   Previous Disposition Home with Family   Who is being Interviewed Patient   What was the patient's/caregiver's perception as to why they think they needed to return back to the hospital? Other (Comment)  (transfer from ED)   Did you visit your Primary Care Physician after you left the hospital, before you returned this time? No   Why weren't you able to visit your PCP? Other (Comment)  (no needed    Transfer)   Did you see a specialist, such as Cardiac, Pulmonary, Orthopedic Physician, etc. after you left the hospital? No   Who advised the patient to return to the hospital? Self-referral   Does the patient report anything that got in the way of taking their medications? No   In our efforts to provide the best possible care to you and others like you, can you think of anything that we could have done to help you after you left the hospital the first time, so that you might not have needed to return so soon?  Other (Comment)  (transfer from [address] [place] [address] ED)

## 2023-12-01 NOTE — CARE COORDINATION
DISCHARGE PLANNING EVALUATION: OP/OBSERVATION        12/1/23, 10:39 AM EST    [address]         Location: [facility]/0971-59   Reason for hospitalization: Hemoptysis [R04.2]     CM Services requested for transitional needs. PCP: Velia Blair    Transportation/Food Security/Housekeeping Addressed:  No issues identified.      Equipment needs: none    Transition plan:  home independently

## 2023-12-01 NOTE — DISCHARGE SUMMARY
[facility]  Office: 7900  1826, DO, [place], DO, Eli Dross, DO, Tomeka Ahmadi Blood, DO, Michael Hurst MD, Darryl Toussaint MD, Carlos Licona MD, Lizbet Campbell MD,  Ela Muse MD, Oumar Contreras MD, Siobhan Major, DO, Katt Rosenthal MD,  Haven Wayne, DO, Monique Escamilla MD, Blake Pearson MD, Abbey Bush, DO, Giles Weiner MD,  Shruthi Singh DO, Neto Herring MD, Debbie Brooks MD, Francis Smith MD, Iram Pedraza MD,  Victoriano Rajan MD, Laya Billingsley MD, Ryan Vides MD, Kelly Hernandez MD, Wesley Talavera DO, Clive Vital MD,  Yesenia Grossman MD, Alex Ramey MD, Venkata Ca, CNP,  Malcom Heimlich, CNP,, Renate Saini, CNP,  Christianne Doty, DNP, Lan Ramos, CNP, Amador Rogers, CNP, Wilder Peralta, CNP, Iban Muse, CNP, Tanisha Perez, CNP, Vanessa Canales, CNP, Gerardo Costello, CNS, Jeaneth Campos, CNP, Paige Mcgowan, [address] [place] [address]      Discharge Summary     Patient ID: Kong Jacobs III  :  1975   MRN: [id number]     ACCOUNT:  [de-identified]   Patient Location : [facility]6129-  Patient's PCP: Jordan Harper Date: 2023   Discharge Date: 2023     Length of Stay: 0  Code Status:  Prior  Admitting Physician: Carlos Licona MD  Discharge Physician: Carlos Licona MD     Active Discharge Diagnosis :     Primary Problem  Hemoptysis      [address] Problems    Diagnosis Date Noted    Hemoptysis [R04.2] 2023    MORAIMA (obstructive sleep apnea) [G47.33] 2023    Malignant neoplasm metastatic to lung [facility]) [C78.00] 10/30/2012    Malignant melanoma of scalp ([address]) [C43.4] 2012       Admission Condition:  fair     Discharged Condition: stable    Hospital Stay:     Hospital Course:  Kong Jacobs III is a 51 y.o. male who was admitted for the management of   Hemoptysis , presented to ER with hemoptysis.    Patient presented to [facility]

## 2023-12-01 NOTE — PROGRESS NOTES
[facility]  Office: [address], DO, Ryan Chavez, DO, Milana Steele, DO, Christopher Eid, DO, Denis Verduzco MD, Renea Plata MD, Jocelin Alvarado MD, Simone Ruffin MD,  Abhilash Thomas MD, Tyler Lofton MD, Ashleigh Child MD,  Wing Katelyn MD, Eve Russo MD, Shelby Reid, DO, Bhavani Velasquez MD,  Issa Glez DO, Sara Kerr MD, Chase Weiss MD, Jose Ramon Augustin MD, Los Parker MD,  Claudia Goldstein MD, Tuan Hopson MD, Allison Fisher MD, Ashleigh Carrizales MD, Carmina Moralez MD, Charley Carter MD, Fabio Waddell DO, Maurice Ann DO, Krishan Anderson MD,  Jeffery Abbasi MD, Connor Brown, CNP,  Elis Donohue, CNP, Lelo Stark, CNP,  Brianna Arreaga, DNP, Handy Winn, CNP, Parag Aguilera, CNP, Bonita Perez, CNP, Dejon Toscano, CNP, Ross Gonzalez, CNP, Joseph Saunders PAMesfinC, Tata Landers PAMesfinC, Sybil, CNP, Rebekah Dent, CNS, Tim Mac, CNP, Chelsy Quan, CNP, Dipika Gloss, [address]      Daily Progress Note     Admit Date: 11/30/2023  Bed/Room No.  3778/4549-09  Admitting Physician : Jocelin Alvarado MD  Code Status :Full Code  Hospital Day:  LOS: 0 days   Chief Complaint:     Hemoptysis    Principal Problem:    Hemoptysis  Active Problems:    Malignant neoplasm metastatic to lung [facility])    Malignant melanoma of scalp ([facility])    MORAIMA (obstructive sleep apnea)  Resolved Problems:    * No resolved hospital problems. *    Subjective : Interval History/Significant events :  12/01/23    Patient denied any cough, hemoptysis overnight. He remains afebrile. Patient is breathing on room air. He is comfortable. Vitals - Stable afebrile  Labs -stable hemoglobin 15.5. Nursing notes , Consults notes reviewed. Overnight events and updates discussed with Nursing staff .    Background History:         Eula Kc III is 51 y.o. male  Who was admitted to the hospital on 11/30/2023 for

## 2023-12-01 NOTE — PLAN OF CARE
Problem: ABCDS Injury Assessment  Goal: Absence of physical injury  12/1/2023 1317 by Venkata Sales RN  Outcome: Adequate for Discharge     Problem: Pain  Goal: Verbalizes/displays adequate comfort level or baseline comfort level  Outcome: Adequate for Discharge     Problem: Safety - Adult  Goal: Free from fall injury  Outcome: Adequate for Discharge

## 2023-12-04 LAB
MICROORGANISM SPEC CULT: NORMAL
MICROORGANISM SPEC CULT: NORMAL
MICROORGANISM/AGENT SPEC: NORMAL
MICROORGANISM/AGENT SPEC: NORMAL
SPECIMEN DESCRIPTION: NORMAL
SPECIMEN DESCRIPTION: NORMAL
SURGICAL PATHOLOGY REPORT: NORMAL

## 2023-12-11 LAB
MICROORGANISM SPEC CULT: NORMAL
MICROORGANISM SPEC CULT: NORMAL
MICROORGANISM/AGENT SPEC: NORMAL
MICROORGANISM/AGENT SPEC: NORMAL
SPECIMEN DESCRIPTION: NORMAL
SPECIMEN DESCRIPTION: NORMAL

## 2024-01-01 LAB
MICROORGANISM SPEC CULT: NORMAL
MICROORGANISM/AGENT SPEC: NORMAL
SPECIMEN DESCRIPTION: NORMAL

## 2024-01-03 LAB
MICROORGANISM SPEC CULT: NORMAL
MICROORGANISM/AGENT SPEC: NORMAL
SPECIMEN DESCRIPTION: NORMAL

## 2024-01-08 LAB
MICROORGANISM SPEC CULT: NORMAL
MICROORGANISM/AGENT SPEC: NORMAL
SPECIMEN DESCRIPTION: NORMAL

## 2024-01-16 LAB
MICROORGANISM SPEC CULT: NORMAL
MICROORGANISM/AGENT SPEC: NORMAL
SPECIMEN DESCRIPTION: NORMAL

## 2024-02-13 ENCOUNTER — OFFICE VISIT (OUTPATIENT)
Dept: FAMILY MEDICINE CLINIC | Age: 49
End: 2024-02-13
Payer: COMMERCIAL

## 2024-02-13 VITALS
HEART RATE: 99 BPM | TEMPERATURE: 98.2 F | RESPIRATION RATE: 18 BRPM | DIASTOLIC BLOOD PRESSURE: 80 MMHG | HEIGHT: 69 IN | SYSTOLIC BLOOD PRESSURE: 120 MMHG | BODY MASS INDEX: 36.38 KG/M2 | WEIGHT: 245.6 LBS | OXYGEN SATURATION: 96 %

## 2024-02-13 DIAGNOSIS — C43.4 MALIGNANT MELANOMA OF NECK (HCC): ICD-10-CM

## 2024-02-13 DIAGNOSIS — Z76.89 ENCOUNTER TO ESTABLISH CARE: ICD-10-CM

## 2024-02-13 DIAGNOSIS — G47.33 OSA (OBSTRUCTIVE SLEEP APNEA): Primary | ICD-10-CM

## 2024-02-13 DIAGNOSIS — C78.00 MELANOMA METASTATIC TO LUNG, UNSPECIFIED LATERALITY (HCC): ICD-10-CM

## 2024-02-13 PROCEDURE — 99204 OFFICE O/P NEW MOD 45 MIN: CPT | Performed by: NURSE PRACTITIONER

## 2024-02-13 NOTE — PROGRESS NOTES
remission.    4. Melanoma metastatic to lung, unspecified laterality (HCC)  Will plan on yearly low dose CT scans going forward to monitor nodules in lungs. Last CT chest was November 2023 at outlying ER.      Return in about 3 months (around 5/13/2024) for or sooner as needed..      Discussed exam, POCT findings, plan of care, and follow-up at length with patient and/or their caregiver. Reviewed all prescribed and recommended medications, administration and side effects. All questions were addressed and answered with verbalization of understanding. The patient and/or the caregiver was agreeable with the plan.   Subjective:   Was coughing up blood between thanksgiving and christmas.   Went to ER at [facility]. Chest X ray and Ct scan negative.  Coughing blood improved and then the next morning he had coughing up blood again and not able to go to  since it had been more than 3 years since he was there. He prior physician was leaving in the next 3 days and he was not able to get in a reasonable amount of time with new provider there. He continued to work and while at work he started coughing up blood again and then came home and went to [facility] at 11 am Tuesday. Was in the ER 23 hours. They did a bronchostomy then at [facility]. No bed at [facility] sp had to wait and he was there until Friday. He had scar tissue from radiation that was seen on bronchoscopy.  December the entire family had cold and symptoms of coughing. Mom was the only one that had covid positive.    History melanoma on the back of his head with mets to the lung.  May 2012 had mole change in characteristic and then started growing. Saw Plastic surgery in July to have to mole removed and it was melanoma and was sent to  for head and neck surgeon to remove the melanoma. In Sept. Had to go back on October to have a graph.. They did a PET scan to help make the wife feel better it showed that he 5 spots in left lung that were metastasis of melanoma.

## 2024-02-14 RX ORDER — TADALAFIL 20 MG/1
20 TABLET ORAL PRN
COMMUNITY

## 2024-03-16 ENCOUNTER — APPOINTMENT (OUTPATIENT)
Dept: CT IMAGING | Age: 49
End: 2024-03-16
Payer: COMMERCIAL

## 2024-03-16 ENCOUNTER — HOSPITAL ENCOUNTER (EMERGENCY)
Age: 49
Discharge: HOME | End: 2024-03-16
Attending: EMERGENCY MEDICINE
Payer: COMMERCIAL

## 2024-03-16 VITALS
SYSTOLIC BLOOD PRESSURE: 119 MMHG | HEART RATE: 98 BPM | RESPIRATION RATE: 22 BRPM | DIASTOLIC BLOOD PRESSURE: 78 MMHG | HEIGHT: 69 IN | BODY MASS INDEX: 36.27 KG/M2

## 2024-03-16 DIAGNOSIS — J98.4 PNEUMONITIS: ICD-10-CM

## 2024-03-16 DIAGNOSIS — J10.1 INFLUENZA A: Primary | ICD-10-CM

## 2024-03-16 LAB
ALBUMIN SERPL-MCNC: 4.2 G/DL (ref 3.5–5.2)
ALBUMIN/GLOB SERPL: 1.3 {RATIO} (ref 1–2.5)
ALP SERPL-CCNC: 111 U/L (ref 40–129)
ALT SERPL-CCNC: 22 U/L (ref 5–41)
ANION GAP SERPL CALCULATED.3IONS-SCNC: 11 MMOL/L (ref 9–17)
AST SERPL-CCNC: 28 U/L
BASOPHILS # BLD: 0 K/UL (ref 0–0.2)
BASOPHILS NFR BLD: 0 % (ref 0–2)
BILIRUB SERPL-MCNC: 0.8 MG/DL (ref 0.3–1.2)
BUN SERPL-MCNC: 14 MG/DL (ref 6–20)
CALCIUM SERPL-MCNC: 9 MG/DL (ref 8.6–10.4)
CHLORIDE SERPL-SCNC: 96 MMOL/L (ref 98–107)
CO2 SERPL-SCNC: 26 MMOL/L (ref 20–31)
CREAT SERPL-MCNC: 1.2 MG/DL (ref 0.7–1.2)
EOSINOPHIL # BLD: 0 K/UL (ref 0–0.4)
EOSINOPHILS RELATIVE PERCENT: 0 % (ref 1–4)
ERYTHROCYTE [DISTWIDTH] IN BLOOD BY AUTOMATED COUNT: 13.3 % (ref 12.5–15.4)
FLUAV AG SPEC QL: POSITIVE
FLUBV AG SPEC QL: NEGATIVE
GFR SERPL CREATININE-BSD FRML MDRD: >60 ML/MIN/1.73M2
GLUCOSE SERPL-MCNC: 96 MG/DL (ref 70–99)
HCT VFR BLD AUTO: 48.3 % (ref 41–53)
HGB BLD-MCNC: 16.2 G/DL (ref 13.5–17.5)
LYMPHOCYTES NFR BLD: 0.7 K/UL (ref 1–4.8)
LYMPHOCYTES RELATIVE PERCENT: 14 % (ref 24–44)
MAGNESIUM SERPL-MCNC: 2 MG/DL (ref 1.6–2.6)
MCH RBC QN AUTO: 29.9 PG (ref 26–34)
MCHC RBC AUTO-ENTMCNC: 33.5 G/DL (ref 31–37)
MCV RBC AUTO: 89.4 FL (ref 80–100)
MONOCYTES NFR BLD: 0.7 K/UL (ref 0.1–1.2)
MONOCYTES NFR BLD: 14 % (ref 2–11)
NEUTROPHILS NFR BLD: 72 % (ref 36–66)
NEUTS SEG NFR BLD: 3.9 K/UL (ref 1.8–7.7)
PLATELET # BLD AUTO: 174 K/UL (ref 140–450)
PMV BLD AUTO: 7.9 FL (ref 6–12)
POTASSIUM SERPL-SCNC: 4.5 MMOL/L (ref 3.7–5.3)
PROT SERPL-MCNC: 7.5 G/DL (ref 6.4–8.3)
RBC # BLD AUTO: 5.41 M/UL (ref 4.5–5.9)
SARS-COV-2 RDRP RESP QL NAA+PROBE: NOT DETECTED
SODIUM SERPL-SCNC: 133 MMOL/L (ref 135–144)
SPECIMEN DESCRIPTION: NORMAL
TROPONIN I SERPL HS-MCNC: 7 NG/L (ref 0–22)
WBC OTHER # BLD: 5.3 K/UL (ref 3.5–11)

## 2024-03-16 PROCEDURE — 85025 COMPLETE CBC W/AUTO DIFF WBC: CPT

## 2024-03-16 PROCEDURE — 84484 ASSAY OF TROPONIN QUANT: CPT

## 2024-03-16 PROCEDURE — 80053 COMPREHEN METABOLIC PANEL: CPT

## 2024-03-16 PROCEDURE — 83735 ASSAY OF MAGNESIUM: CPT

## 2024-03-16 PROCEDURE — 71260 CT THORAX DX C+: CPT

## 2024-03-16 PROCEDURE — 87804 INFLUENZA ASSAY W/OPTIC: CPT

## 2024-03-16 PROCEDURE — 93005 ELECTROCARDIOGRAM TRACING: CPT | Performed by: EMERGENCY MEDICINE

## 2024-03-16 PROCEDURE — 36415 COLL VENOUS BLD VENIPUNCTURE: CPT

## 2024-03-16 PROCEDURE — 2580000003 HC RX 258: Performed by: EMERGENCY MEDICINE

## 2024-03-16 PROCEDURE — 99285 EMERGENCY DEPT VISIT HI MDM: CPT

## 2024-03-16 PROCEDURE — 6360000002 HC RX W HCPCS: Performed by: EMERGENCY MEDICINE

## 2024-03-16 PROCEDURE — 87635 SARS-COV-2 COVID-19 AMP PRB: CPT

## 2024-03-16 PROCEDURE — 6360000004 HC RX CONTRAST MEDICATION: Performed by: EMERGENCY MEDICINE

## 2024-03-16 PROCEDURE — 96374 THER/PROPH/DIAG INJ IV PUSH: CPT

## 2024-03-16 RX ORDER — 0.9 % SODIUM CHLORIDE 0.9 %
500 INTRAVENOUS SOLUTION INTRAVENOUS ONCE
Status: COMPLETED | OUTPATIENT
Start: 2024-03-16 | End: 2024-03-16

## 2024-03-16 RX ORDER — PREDNISONE 10 MG/1
10 TABLET ORAL SEE ADMIN INSTRUCTIONS
Qty: 17 TABLET | Refills: 0 | Status: SHIPPED | OUTPATIENT
Start: 2024-03-16 | End: 2024-03-22

## 2024-03-16 RX ORDER — 0.9 % SODIUM CHLORIDE 0.9 %
80 INTRAVENOUS SOLUTION INTRAVENOUS ONCE
Status: DISCONTINUED | OUTPATIENT
Start: 2024-03-16 | End: 2024-03-16 | Stop reason: HOSPADM

## 2024-03-16 RX ORDER — SODIUM CHLORIDE 0.9 % (FLUSH) 0.9 %
10 SYRINGE (ML) INJECTION PRN
Status: DISCONTINUED | OUTPATIENT
Start: 2024-03-16 | End: 2024-03-16 | Stop reason: HOSPADM

## 2024-03-16 RX ORDER — DEXAMETHASONE SODIUM PHOSPHATE 10 MG/ML
10 INJECTION, SOLUTION INTRAMUSCULAR; INTRAVENOUS ONCE
Status: COMPLETED | OUTPATIENT
Start: 2024-03-16 | End: 2024-03-16

## 2024-03-16 RX ORDER — AZITHROMYCIN 250 MG/1
TABLET, FILM COATED ORAL
Qty: 1 PACKET | Refills: 0 | Status: SHIPPED | OUTPATIENT
Start: 2024-03-16

## 2024-03-16 RX ADMIN — Medication 80 ML: at 11:45

## 2024-03-16 RX ADMIN — IOPAMIDOL 75 ML: 755 INJECTION, SOLUTION INTRAVENOUS at 11:45

## 2024-03-16 RX ADMIN — SODIUM CHLORIDE, PRESERVATIVE FREE 10 ML: 5 INJECTION INTRAVENOUS at 11:46

## 2024-03-16 RX ADMIN — SODIUM CHLORIDE 500 ML: 9 INJECTION, SOLUTION INTRAVENOUS at 11:19

## 2024-03-16 RX ADMIN — DEXAMETHASONE SODIUM PHOSPHATE 10 MG: 10 INJECTION, SOLUTION INTRAMUSCULAR; INTRAVENOUS at 11:19

## 2024-03-16 ASSESSMENT — LIFESTYLE VARIABLES
HOW OFTEN DO YOU HAVE A DRINK CONTAINING ALCOHOL: MONTHLY OR LESS
HOW MANY STANDARD DRINKS CONTAINING ALCOHOL DO YOU HAVE ON A TYPICAL DAY: 1 OR 2

## 2024-03-16 ASSESSMENT — ENCOUNTER SYMPTOMS
VOMITING: 0
SHORTNESS OF BREATH: 1
DIARRHEA: 0
SORE THROAT: 0
CHEST TIGHTNESS: 1
COUGH: 1

## 2024-03-16 ASSESSMENT — PAIN - FUNCTIONAL ASSESSMENT: PAIN_FUNCTIONAL_ASSESSMENT: 0-10

## 2024-03-16 ASSESSMENT — PAIN DESCRIPTION - DESCRIPTORS: DESCRIPTORS: TIGHTNESS

## 2024-03-16 ASSESSMENT — PAIN DESCRIPTION - LOCATION: LOCATION: CHEST

## 2024-03-16 ASSESSMENT — PAIN SCALES - GENERAL: PAINLEVEL_OUTOF10: 3

## 2024-03-16 NOTE — ED TRIAGE NOTES
Patient has been having tightness and dizziness. Thursday Coughing white thick sputum for a month.

## 2024-03-16 NOTE — ED PROVIDER NOTES
[facility] Emergency Department  [address]  [facility] [place] [address]  Phone: [phone]  Fax: [phone]        [facility] EMERGENCY DEPARTMENT  EMERGENCY DEPARTMENT ENCOUNTER      Pt Name: Oracio Whyte III  MRN: [id number]  Birthdate 1975  Date of evaluation: 3/16/2024  Provider: Miguelito Ruiz DO    CHIEF COMPLAINT       Chief Complaint   Patient presents with    Chest Pain     tightness    Shortness of Breath    Fatigue    Cough    Dizziness         HISTORY OF PRESENT ILLNESS   (Location/Symptom, Timing/Onset,Context/Setting, Quality, Duration, Modifying Factors, Severity)  Note limiting factors.   Oracio Whyte III is a 49 y.o. male who presents to the emergency department for the evaluation of chest tightness, cough, fatigue, shortness of breath.  He has a history of metastatic melanoma, he has actually had some radiation to the lung because of it.  He had some hemoptysis back in the fall, had a bronchoscopy and they only follow a very small area with some bleeding.  He states this feels different, feels more like an upper respiratory infection or pneumonia.  He has had some fatigue and been coughing for a few days and now that he is coughing up the mucus it feels a little bit better.  He was concerned about the tightness in the chest.  He had some trouble sleeping last night.    Nursing Notes were reviewed.    REVIEW OF SYSTEMS    (2-9systems for level 4, 10 or more for level 5)     Review of Systems   Constitutional:  Positive for fatigue. Negative for fever.   HENT:  Negative for sore throat.    Respiratory:  Positive for cough, chest tightness and shortness of breath.    Cardiovascular:  Negative for chest pain.   Gastrointestinal:  Negative for diarrhea and vomiting.   Genitourinary:  Negative for dysuria.   Skin:  Negative for rash.   Neurological:  Negative for weakness.   All other systems reviewed and are negative.      Except  Efforts were made to edit the dictations but occasionally words are mis-transcribed.)    Miguelito Ruiz DO,(electronically signed)  Board Certified Emergency Physician          Miguelito Ruiz DO  03/16/24 [id number]

## 2024-03-17 LAB
EKG ATRIAL RATE: 96 BPM
EKG P AXIS: 81 DEGREES
EKG P-R INTERVAL: 162 MS
EKG Q-T INTERVAL: 338 MS
EKG QRS DURATION: 94 MS
EKG QTC CALCULATION (BAZETT): 427 MS
EKG R AXIS: 59 DEGREES
EKG T AXIS: 55 DEGREES
EKG VENTRICULAR RATE: 96 BPM

## 2024-04-10 ENCOUNTER — COMMUNITY OUTREACH (OUTPATIENT)
Dept: FAMILY MEDICINE CLINIC | Age: 49
End: 2024-04-10

## 2024-05-31 ENCOUNTER — TELEPHONE (OUTPATIENT)
Dept: SLEEP CENTER | Age: 49
End: 2024-05-31

## 2024-05-31 NOTE — TELEPHONE ENCOUNTER
2/19/24 Spoke with wife she is going to to call the office of doctor who originally ordered sleep study and try to get  those results and see if [employer] will use those. 5/15/24 Called pt left message for spouse to call us back with an update. If pt needs to come to have the study done then pt will need to see the doctor first for an updated H&P and orders.

## (undated) DEVICE — TRAP,MUCUS SPECIMEN, 80CC: Brand: MEDLINE

## (undated) DEVICE — GARMENT COMPR L FOR 23IN CALF FLOTRN